# Patient Record
Sex: MALE | Race: WHITE | NOT HISPANIC OR LATINO | Employment: OTHER | URBAN - METROPOLITAN AREA
[De-identification: names, ages, dates, MRNs, and addresses within clinical notes are randomized per-mention and may not be internally consistent; named-entity substitution may affect disease eponyms.]

---

## 2023-03-13 ENCOUNTER — TRANSCRIBE ORDERS (OUTPATIENT)
Dept: ADMINISTRATIVE | Facility: HOSPITAL | Age: 68
End: 2023-03-13
Payer: MEDICARE

## 2023-03-13 DIAGNOSIS — R91.8 LUNG NODULES: Primary | ICD-10-CM

## 2023-03-14 ENCOUNTER — HOSPITAL ENCOUNTER (OUTPATIENT)
Dept: PET IMAGING | Facility: HOSPITAL | Age: 68
Discharge: HOME | End: 2023-03-14
Payer: MEDICARE

## 2023-03-14 DIAGNOSIS — R91.8 LUNG NODULES: ICD-10-CM

## 2023-03-14 PROCEDURE — 78815 PET IMAGE W/CT SKULL-THIGH: CPT

## 2023-03-14 PROCEDURE — A9552 F18 FDG: HCPCS | Performed by: INTERNAL MEDICINE

## 2023-03-14 PROCEDURE — 0 FLUDEOXYGLUCOSE F18 SOLUTION: Performed by: INTERNAL MEDICINE

## 2023-03-14 RX ADMIN — FLUDEOXYGLUCOSE F18 1 DOSE: 300 INJECTION INTRAVENOUS at 14:06

## 2024-04-04 ENCOUNTER — OFFICE VISIT (OUTPATIENT)
Dept: PULMONOLOGY | Facility: CLINIC | Age: 69
End: 2024-04-04
Payer: MEDICARE

## 2024-04-04 VITALS
TEMPERATURE: 97.8 F | DIASTOLIC BLOOD PRESSURE: 88 MMHG | RESPIRATION RATE: 14 BRPM | HEART RATE: 72 BPM | OXYGEN SATURATION: 94 % | SYSTOLIC BLOOD PRESSURE: 139 MMHG | BODY MASS INDEX: 39.82 KG/M2 | HEIGHT: 60 IN | WEIGHT: 202.8 LBS

## 2024-04-04 DIAGNOSIS — R91.8 MULTIPLE LUNG NODULES: Primary | ICD-10-CM

## 2024-04-04 PROCEDURE — 1159F MED LIST DOCD IN RCRD: CPT | Performed by: INTERNAL MEDICINE

## 2024-04-04 PROCEDURE — 99203 OFFICE O/P NEW LOW 30 MIN: CPT | Performed by: INTERNAL MEDICINE

## 2024-04-04 PROCEDURE — 1160F RVW MEDS BY RX/DR IN RCRD: CPT | Performed by: INTERNAL MEDICINE

## 2024-04-04 RX ORDER — SPIRONOLACTONE 25 MG/1
25 TABLET ORAL DAILY
COMMUNITY

## 2024-04-04 RX ORDER — IRBESARTAN 150 MG/1
300 TABLET ORAL DAILY
COMMUNITY

## 2024-04-04 RX ORDER — AMLODIPINE BESYLATE 5 MG/1
5 TABLET ORAL DAILY
COMMUNITY

## 2024-04-04 RX ORDER — ATORVASTATIN CALCIUM 20 MG/1
20 TABLET, FILM COATED ORAL DAILY
COMMUNITY

## 2024-04-04 NOTE — PROGRESS NOTES
Pulmonary Consultation    Radha Elliott AP*,    Thank you for asking me to see Johan Rinaldi for   Chief Complaint   Patient presents with    Establish Care    Lung Nodule   .      History of Present Illness  Johan Rinaldi is a 68 y.o. male with a PMH significant for tobacco abuse and multiple lung nodules presents for evaluation patient has had CT scan and PET scan done for evaluation of lung nodules he denies any shortness of breath cough wheeze expectoration night sweats fever or weight loss patient has already quit smoking and used to work on the basement walls      Tobacco use history:  Former smoker      Review of Systems: History obtained from chart review and the patient.  Review of Systems   All other systems reviewed and are negative.    As described in the HPI. Otherwise, remainder of ROS (14 systems) were negative.    There is no problem list on file for this patient.        Current Outpatient Medications:     amLODIPine (NORVASC) 5 MG tablet, Take 1 tablet by mouth Daily., Disp: , Rfl:     aspirin 81 MG oral suspension, Take 81 mL by mouth 1 (One) Time., Disp: , Rfl:     atorvastatin (LIPITOR) 20 MG tablet, Take 1 tablet by mouth Daily., Disp: , Rfl:     irbesartan (AVAPRO) 150 MG tablet, Take 2 tablets by mouth Daily., Disp: , Rfl:     spironolactone (ALDACTONE) 25 MG tablet, Take 1 tablet by mouth Daily., Disp: , Rfl:     No Known Allergies    History reviewed. No pertinent past medical history.  History reviewed. No pertinent surgical history.  Social History     Socioeconomic History    Marital status:    Tobacco Use    Smoking status: Former     Types: Cigarettes     Start date:      Quit date: 1974     Years since quittin.2    Smokeless tobacco: Never   Substance and Sexual Activity    Alcohol use: Yes    Drug use: Never     Family History   Problem Relation Age of Onset    No Known Problems Mother     Cancer Sister        CT Chest Without Contrast Diagnostic    Result  "Date: 3/4/2024  Ascending aortic aneurysm measuring up to 4.8 cm, stable from prior exam. Stable pulmonary nodules. Six-month follow-up CT recommended. Cholelithiasis. Images reviewed, interpreted, and dictated by Benjamín Johnson DO         Objective     Blood pressure 139/88, pulse 72, temperature 97.8 °F (36.6 °C), temperature source Tympanic, resp. rate 14, height 152.4 cm (60\"), weight 92 kg (202 lb 12.8 oz), SpO2 94%.  Physical Exam  Vitals and nursing note reviewed.   Constitutional:       Appearance: Normal appearance.   HENT:      Head: Normocephalic and atraumatic.      Nose: Nose normal.      Mouth/Throat:      Mouth: Mucous membranes are moist.      Pharynx: Oropharynx is clear.   Eyes:      Extraocular Movements: Extraocular movements intact.      Conjunctiva/sclera: Conjunctivae normal.      Pupils: Pupils are equal, round, and reactive to light.   Cardiovascular:      Rate and Rhythm: Normal rate and regular rhythm.      Pulses: Normal pulses.      Heart sounds: Normal heart sounds.   Pulmonary:      Effort: Pulmonary effort is normal.      Breath sounds: Normal breath sounds.   Abdominal:      General: Abdomen is flat. Bowel sounds are normal.      Palpations: Abdomen is soft.   Musculoskeletal:         General: Normal range of motion.      Cervical back: Normal range of motion and neck supple.   Skin:     General: Skin is warm.      Capillary Refill: Capillary refill takes 2 to 3 seconds.   Neurological:      General: No focal deficit present.      Mental Status: He is alert and oriented to person, place, and time.   Psychiatric:         Mood and Affect: Mood normal.         Behavior: Behavior normal.       Immunization History   Administered Date(s) Administered    COVID-19 (UNSPECIFIED) 03/23/2021, 11/24/2021    COVID-19 F23 (MODERNA) 12YRS+ (SPIKEVAX) 02/23/2021    Pneumococcal Conjugate 13-Valent (PCV13) 08/29/2018            Assessment & Plan     Diagnoses and all orders for this visit:    1. " Multiple lung nodules (Primary)  -     CT Chest With Contrast; Future         Result Review :       Data reviewed : Radiologic studies CT chest      Discussion/ Recommendations:   CT scans and PET scan reviewed and lung nodules have been stable thus far  We will repeat CT chest in 6 months time as per radiology recommendations  Patient has quit smoking and is asymptomatic and has been reassured  Discussed vaccination and recommended    Class 2 Severe Obesity (BMI >=35 and <=39.9). Obesity-related health conditions include the following: hypertension. Obesity is unchanged. BMI is is above average; BMI management plan is completed. We discussed low calorie, low carb based diet program, portion control, and increasing exercise.           Return in about 6 months (around 10/4/2024).      Thank you for allowing me to participate in the care of Johan Rinaldi. Please do not hesitate to contact me with any questions.         This document has been electronically signed by Pérez Thornton MD on April 4, 2024 14:33 EDT

## 2025-04-22 ENCOUNTER — HOSPITAL ENCOUNTER (OUTPATIENT)
Dept: OTHER | Facility: HOSPITAL | Age: 70
Discharge: HOME | End: 2025-04-22

## 2025-05-13 ENCOUNTER — OFFICE VISIT (OUTPATIENT)
Dept: PULMONOLOGY | Facility: CLINIC | Age: 70
End: 2025-05-13
Payer: MEDICARE

## 2025-05-13 VITALS
BODY MASS INDEX: 39.62 KG/M2 | HEIGHT: 60 IN | DIASTOLIC BLOOD PRESSURE: 89 MMHG | TEMPERATURE: 98.1 F | SYSTOLIC BLOOD PRESSURE: 141 MMHG | RESPIRATION RATE: 16 BRPM | WEIGHT: 201.8 LBS | HEART RATE: 74 BPM | OXYGEN SATURATION: 97 %

## 2025-05-13 DIAGNOSIS — D72.19 PERIPHERAL EOSINOPHILIA: ICD-10-CM

## 2025-05-13 DIAGNOSIS — F17.201 TOBACCO ABUSE, IN REMISSION: ICD-10-CM

## 2025-05-13 DIAGNOSIS — R91.8 MULTIPLE LUNG NODULES: Primary | ICD-10-CM

## 2025-05-13 DIAGNOSIS — E66.812 CLASS 2 OBESITY: ICD-10-CM

## 2025-05-13 PROCEDURE — 1159F MED LIST DOCD IN RCRD: CPT | Performed by: INTERNAL MEDICINE

## 2025-05-13 PROCEDURE — 99214 OFFICE O/P EST MOD 30 MIN: CPT | Performed by: INTERNAL MEDICINE

## 2025-05-13 PROCEDURE — 1160F RVW MEDS BY RX/DR IN RCRD: CPT | Performed by: INTERNAL MEDICINE

## 2025-05-13 RX ORDER — DIPHENOXYLATE HYDROCHLORIDE AND ATROPINE SULFATE 2.5; .025 MG/1; MG/1
1 TABLET ORAL DAILY
COMMUNITY

## 2025-05-13 NOTE — PROGRESS NOTES
Primary Care Provider  Provider, No Known   Referring Provider  No ref. provider found      Patient Complaint  Lung Nodule, Follow-up (1 year ), and Results (CT 25)      Subjective          Johan Rinaldi presents to [facility]      History of Presenting Illness  Johan Rinaldi is a 69 y.o. male here for follow-up for follow-up of multiple lung nodules.  Has been following lung nodules for the last 2 years.  Lung nodules have been stable in size.  Previous PET/CT in  were all negative.  Suspect granulomatous.  Incidentally does have an aortic aneurysm is being followed by cardiothoracic surgery up in [place].  Denies any respiratory complaints at this time.  Former cigarette smoker and quit in the year  and not eligible for lung cancer screening.  Not on any inhalers.    Denies dyspnea, coughing, wheezing, headaches, chest pain, weight loss or hemoptysis. Denies fevers, chills and night sweats. He is able to perform ADLs without difficulties and denies any swollen glands/lymph nodes in the head or neck.          Family History   Problem Relation Age of Onset    No Known Problems Mother     Cancer Sister         Social History     Socioeconomic History    Marital status:    Tobacco Use    Smoking status: Former     Current packs/day: 0.00     Average packs/day: 1 pack/day for 26.0 years (26.0 ttl pk-yrs)     Types: Cigarettes     Start date:      Quit date:      Years since quittin.3     Passive exposure: Past    Smokeless tobacco: Never   Vaping Use    Vaping status: Never Used   Substance and Sexual Activity    Alcohol use: Yes    Drug use: Never        History reviewed. No pertinent past medical history.     Immunization History   Administered Date(s) Administered    COVID-19 (MODERNA) 12YRS+ (SPIKEVAX) 2021    COVID-19 (UNSPECIFIED) 2021, 2021    FLUAD TRI 65YR+ 2025    Pneumococcal Conjugate 13-Valent  "(PCV13) 08/29/2018         No Known Allergies       Current Outpatient Medications:     amLODIPine (NORVASC) 5 MG tablet, Take 1 tablet by mouth Daily., Disp: , Rfl:     aspirin 81 MG oral suspension, Take 81 mL by mouth 1 (One) Time., Disp: , Rfl:     atorvastatin (LIPITOR) 20 MG tablet, Take 1 tablet by mouth Daily., Disp: , Rfl:     irbesartan (AVAPRO) 150 MG tablet, Take 2 tablets by mouth Daily., Disp: , Rfl:     multivitamin tablet tablet, Take 1 tablet by mouth Daily., Disp: , Rfl:     spironolactone (ALDACTONE) 25 MG tablet, Take 1 tablet by mouth Daily., Disp: , Rfl:          Objective     Vital Signs:   /89 (BP Location: Left arm, Patient Position: Sitting, Cuff Size: Adult)   Pulse 74   Temp 98.1 °F (36.7 °C) (Oral)   Resp 16   Ht 152.4 cm (60\")   Wt 91.5 kg (201 lb 12.8 oz)   SpO2 97% Comment: Room air  BMI 39.41 kg/m²     Physical Exam  Vital Signs Reviewed   WDWN, Alert, NAD.    HEENT:  PERRL, EOMI.  OP, nares clear  Chest:  good aeration, clear to auscultation bilaterally, tympanic to percussion bilaterally, no work of breathing noted  CV: RRR, no MGR, pulses 2+, equal.  EXT:  no clubbing, no cyanosis, no edema, obese  Neuro:  A&Ox3, CN grossly intact, no focal deficits.  Skin: No rashes or lesions noted       Result Review :   I have personally reviewed the previous office notes from Dr. Thornton.  PET/CT from 2023 personally reviewed showing no uptake in multiple tiny lung nodules.  Multiple chest CTs between 2023 and 2025 personally reviewed showing multiple tiny stable lung nodules.  2025 CBC personally reviewed showing 200 peripheral eosinophils.  CMP with no evidence of chronic hypercapnia.       Assessment and Plan        * No active hospital problems. *          Impression:  Small lung nodules.  Stable in size x 3 years.  PET negative in 2023.  These are benign granulomas  Ascending aortic aneurysm.  Incidentally found.  Being monitored by Dr. Porras.  Peripheral eosinophilia.  " Tobacco use of cigarettes in remission.  Quit smoking in 2000.  Not eligible for lung cancer screening  Class II obesity BMI 39.4    Plan:  Given these nodules have not changed in 36 months and PET was 2 years ago, these are consistent with granulomatous lung disease and are benign.  No further imaging is recommended to follow these.  This patient is not eligible for lung cancer screening as he quit smoking 25 years ago.    Any further imaging to follow the ascending thoracic aortic aneurysm, I defer to the patient's cardiothoracic surgeon  Smoking status: Former smoker.  Quit 25 years ago.  Not eligible for lung cancer screening  Vaccination status: Up-to-date with flu and pneumonia vaccines  Medications personally reviewed    Follow Up   Return if symptoms worsen or fail to improve.  Patient was given instructions and counseling regarding his condition or for health maintenance advice. Please see specific information pulled into the AVS if appropriate.     Electronically signed by Marcin Burnett MD, 05/13/25, 10:30 AM EDT.